# Patient Record
Sex: MALE | Race: WHITE | NOT HISPANIC OR LATINO
[De-identification: names, ages, dates, MRNs, and addresses within clinical notes are randomized per-mention and may not be internally consistent; named-entity substitution may affect disease eponyms.]

---

## 2023-06-20 PROBLEM — Z00.00 ENCOUNTER FOR PREVENTIVE HEALTH EXAMINATION: Status: ACTIVE | Noted: 2023-06-20

## 2023-06-21 ENCOUNTER — APPOINTMENT (OUTPATIENT)
Dept: ORTHOPEDIC SURGERY | Facility: CLINIC | Age: 48
End: 2023-06-21
Payer: COMMERCIAL

## 2023-06-21 VITALS — BODY MASS INDEX: 29.49 KG/M2 | WEIGHT: 206 LBS | HEIGHT: 70 IN

## 2023-06-21 DIAGNOSIS — Z86.39 PERSONAL HISTORY OF OTHER ENDOCRINE, NUTRITIONAL AND METABOLIC DISEASE: ICD-10-CM

## 2023-06-21 PROCEDURE — 73564 X-RAY EXAM KNEE 4 OR MORE: CPT | Mod: 50

## 2023-06-21 PROCEDURE — 20611 DRAIN/INJ JOINT/BURSA W/US: CPT | Mod: 50

## 2023-06-21 PROCEDURE — 99214 OFFICE O/P EST MOD 30 MIN: CPT | Mod: 25

## 2023-06-21 PROCEDURE — J3490M: CUSTOM

## 2023-06-21 RX ORDER — LEVOTHYROXINE SODIUM 137 UG/1
TABLET ORAL
Refills: 0 | Status: ACTIVE | COMMUNITY

## 2023-06-27 ENCOUNTER — APPOINTMENT (OUTPATIENT)
Dept: MRI IMAGING | Facility: CLINIC | Age: 48
End: 2023-06-27

## 2023-06-27 NOTE — DISCUSSION/SUMMARY
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 \par X-Ray left knee reveals evidence of mild degenerative change - maintained lateral joint line space \par \par Discussed treatment options in the form of injection therapy to aid in inflammation. Patient elected to receive BILATERAL KNEE 18/4 CSI under ultrasound guidance. Advised patient to rest and ice the area. \par \par In regard to RIGHT KNEE...\par \par Discussed risks of surgical treatment and nonsurgical treatment of arthritis, discussed risks of steroid injection plus or minus viscosupplementation, risks of zilretta and benefits, role of surgery and MRI, risks and role of NSAIDs and side effects, benefits of therapy. \par Discussed risks and benefits of total knee arthroplasty vs arthroscopic surgery however, risks outweigh benefits and we will try to avoid.\par The risks and benefits of surgery have been discussed. Risks include but are not limited to bleeding, infection, reaction to anesthesia, injury to blood vessels and nerves, malunion, nonunion, DVT, PE, necessity of repeat surgery, chronic pain, loss of limb and death. The patient understands the risks and has chosen to proceed with conservative care. All questions have been answered.\par \par Will put in auth for EUFLEXXA RIGHT KNEE. \par Prescribed patient Mobic, and discussed risks of side effects and timing and management of medication.  Side effects can include but are not limited to gi ulcers and irritation, as well as kidney failure and bleeding issues. Use as directed and take with food.\par \par In regard to LEFT KNEE...\par \par Due to worsening pain and instability with catching/locking mechanical symptoms, recommend the patient obtain MRI left knee to rule out possible lateral meniscus tear vs OA. Follow up after MRI to possibly rule out surgical pathology and discuss future treatment options. \par \par Follow up 2-3 weeks

## 2023-06-27 NOTE — PROCEDURE
[Large Joint Injection] : Large joint injection [Bilateral] : bilaterally of the [Knee] : knee [X-ray evidence of Osteoarthritis on this or prior visit] : x-ray evidence of Osteoarthritis on this or prior visit [Alcohol] : alcohol [Betadine] : betadine [Ethyl Chloride sprayed topically] : ethyl chloride sprayed topically [Sterile technique used] : sterile technique used [___ cc    3mg] :  Betamethasone (Celestone) ~Vcc of 3mg [___ cc    0.25%] : Bupivacaine (Marcaine) ~Vcc of 0.25%  [Call if redness, pain or fever occur] : call if redness, pain or fever occur [Apply ice for 15min out of every hour for the next 12-24 hours as tolerated] : apply ice for 15 minutes out of every hour for the next 12-24 hours as tolerated [Patient was advised to rest the joint(s) for ____ days] : patient was advised to rest the joint(s) for [unfilled] days [Previous OTC use and PT nontherapeutic] : patient has tried OTC's including aspirin, Ibuprofen, Aleve, etc or prescription NSAIDS, and/or exercises at home and/or physical therapy without satisfactory response [Risks, benefits, alternatives discussed / Verbal consent obtained] : the risks benefits, and alternatives have been discussed, and verbal consent was obtained [Prior failure or difficult injection] : prior failure or difficult injection [All ultrasound images have been permanently captured and stored accordingly in our picture archiving and communication system] : All ultrasound images have been permanently captured and stored accordingly in our picture archiving and communication system [Visualization of the needle and placement of injection was performed without complication] : visualization of the needle and placement of injection was performed without complication [Pain] : pain [Arthritis] : arthritis [FreeTextEntry1] : BILATERAL KNEE 18/4 [FreeTextEntry3] : Large joint injections were performed of bilateral knees. Injections of Bupivacaine (Marcaine) cc of 0.5% was used Betamethasone (Celestone) cc of 6mg. \par Patient was advised to call if redness, pain or fever occur and apply ice for 15 minutes out of every hour for the next 12-24 hours as tolerated. \par \par Patient has tried OTC's including aspirin, Ibuprofen, Aleve, etc or prescription NSAIDS, and/or exercises at home and/or physical therapy without satisfactory response and the risks benefits, and alternatives have been discussed, and verbal consent was obtained. \par \par Ultrasound guidance was indicated for this patient due to precise injection in area of tear. All ultrasound images have been permanently captured and stored accordingly in our picture archiving and communication system. Visualization of the needle and placement of injection was performed without complication. \par The findings showed no evidence of ligament, tendon or muscle tear and no effusion or other fluid collection.

## 2023-06-27 NOTE — HISTORY OF PRESENT ILLNESS
[de-identified] : New patient is here for bilateral knee pain. Pt has been seen for the right knee before, finished Euflexxa injections in the right knee on 2/1/22, began noticing pain about 4 months- no specific injury. Right knee ACL recon about 22 years. Left knee has never been looked at, pain started 1 month ago- no specific injury. No previous issues with the left knee, has never had injections in the left knee. Interested in injection for knees if applicable. Pain in bilateral knees does not radiate. Not going to PT, only takes Advil when needed.

## 2023-06-27 NOTE — PHYSICAL EXAM
[Left] : left knee [Right] : right knee [NL (140)] : flexion 140 degrees [NL (0)] : extension 0 degrees [5___] : hamstring 5[unfilled]/5 [Negative] : negative anterior draw [Bilateral] : knee bilaterally [All Views] : anteroposterior, lateral, skyline, and anteroposterior standing [Positive] : positive Maryellen [] : no pain with varus stress [FreeTextEntry9] : X-Ray right knee reveals evidence of previous ACl reconstruction with associated ACL deficient arthritis. \par X-Ray left knee reveals evidence of mild degenerative change - maintained lateral joint line space  [de-identified] : extension 3 degrees

## 2023-06-28 ENCOUNTER — APPOINTMENT (OUTPATIENT)
Dept: ORTHOPEDIC SURGERY | Facility: CLINIC | Age: 48
End: 2023-06-28
Payer: COMMERCIAL

## 2023-06-28 VITALS — HEIGHT: 70 IN | WEIGHT: 206 LBS | BODY MASS INDEX: 29.49 KG/M2

## 2023-06-28 PROCEDURE — 99213 OFFICE O/P EST LOW 20 MIN: CPT | Mod: 25

## 2023-06-28 PROCEDURE — 20611 DRAIN/INJ JOINT/BURSA W/US: CPT | Mod: 50

## 2023-07-06 NOTE — HISTORY OF PRESENT ILLNESS
[de-identified] : Patient is here for a follow up on bilateral knees. Patient notes Celestone injection did give some relief. Patient would like to continue with Euflexxa injections if applicable htough still in pain and unsurehe should proceed. Patient notes the left knee is better than the right. Patient did not get the MRI on the left knee yet.

## 2023-07-06 NOTE — PHYSICAL EXAM
[Left] : left knee [Positive] : positive Maryellen [Right] : right knee [NL (140)] : flexion 140 degrees [NL (0)] : extension 0 degrees [5___] : hamstring 5[unfilled]/5 [Negative] : negative anterior draw [] : no pain with varus stress [FreeTextEntry9] : With crepitus  [de-identified] : extension 3 degrees

## 2023-07-06 NOTE — DISCUSSION/SUMMARY
[Medication Risks Reviewed] : Medication risks reviewed [Surgical risks reviewed] : Surgical risks reviewed [de-identified] : \par \par The patient reports mild improvement from previous bilateral knee CSI on 06/21/23. \par \par Discussed risks of surgical treatment and nonsurgical treatment of arthritis, discussed risks of steroid injection plus or minus viscosupplementation, risks of zilretta and benefits, role of surgery and MRI, risks and role of NSAIDs and side effects, benefits of therapy. \par evaluated knee and decided to proceed with euflexxa injections, discussed future treatment and option, will proceed, discussed possible surgery vs alternatives in future\par The risks, benefits and contents of the injection have been discussed.  Risks include but are not limited to allergic reaction, flare reaction, permanent white skin discoloration at the injection site and infection.  The patient understands the risks and agrees to having the injection.  All questions have been answered.\par \par \par Discussed further treatment options in the form of injection therapy. Patient elected to receive BILATERAL KNEE EUFLEXXA #1 under ultrasound guidance. Advised patient to rest and ice the area. \par \par Discussed risks and benefits of total knee arthroplasty vs arthroscopic surgery however, risks outweigh benefits and we will try to avoid.\par \par Prescribed patient Mobic, and discussed risks of side effects and timing and management of medication.  Side effects can include but are not limited to gi ulcers and irritation, as well as kidney failure and bleeding issues. Use as directed and take with food.\par \par In regard to LEFT KNEE...\par Patient never obtain MRI left knee. \par Due to worsening pain and instability with catching/locking mechanical symptoms, recommend the patient obtain MRI left knee to rule out possible lateral meniscus tear vs OA. Follow up after MRI to possibly rule out surgical pathology and discuss future treatment options. \par \par Follow up 1 week

## 2023-07-06 NOTE — PROCEDURE
[Large Joint Injection] : Large joint injection [Bilateral] : bilaterally of the [Knee] : knee [X-ray evidence of Osteoarthritis on this or prior visit] : x-ray evidence of Osteoarthritis on this or prior visit [Alcohol] : alcohol [Betadine] : betadine [Ethyl Chloride sprayed topically] : ethyl chloride sprayed topically [Sterile technique used] : sterile technique used [Euflexxa(20mg)] : 20mg of Euflexxa [#1] : series #1 [Call if redness, pain or fever occur] : call if redness, pain or fever occur [Apply ice for 15min out of every hour for the next 12-24 hours as tolerated] : apply ice for 15 minutes out of every hour for the next 12-24 hours as tolerated [Patient was advised to rest the joint(s) for ____ days] : patient was advised to rest the joint(s) for [unfilled] days [Previous OTC use and PT nontherapeutic] : patient has tried OTC's including aspirin, Ibuprofen, Aleve, etc or prescription NSAIDS, and/or exercises at home and/or physical therapy without satisfactory response [Risks, benefits, alternatives discussed / Verbal consent obtained] : the risks benefits, and alternatives have been discussed, and verbal consent was obtained [Prior failure or difficult injection] : prior failure or difficult injection [All ultrasound images have been permanently captured and stored accordingly in our picture archiving and communication system] : All ultrasound images have been permanently captured and stored accordingly in our picture archiving and communication system [Visualization of the needle and placement of injection was performed without complication] : visualization of the needle and placement of injection was performed without complication [Pain] : pain [FreeTextEntry1] : BILATERAL KNEE EUFLEXXA #1

## 2023-07-10 ENCOUNTER — FORM ENCOUNTER (OUTPATIENT)
Age: 48
End: 2023-07-10

## 2023-07-11 ENCOUNTER — APPOINTMENT (OUTPATIENT)
Dept: MRI IMAGING | Facility: CLINIC | Age: 48
End: 2023-07-11
Payer: COMMERCIAL

## 2023-07-11 PROCEDURE — 73721 MRI JNT OF LWR EXTRE W/O DYE: CPT | Mod: LT

## 2023-07-12 ENCOUNTER — APPOINTMENT (OUTPATIENT)
Dept: ORTHOPEDIC SURGERY | Facility: CLINIC | Age: 48
End: 2023-07-12
Payer: COMMERCIAL

## 2023-07-12 VITALS — BODY MASS INDEX: 29.49 KG/M2 | HEIGHT: 70 IN | WEIGHT: 206 LBS

## 2023-07-12 DIAGNOSIS — M23.91 UNSPECIFIED INTERNAL DERANGEMENT OF RIGHT KNEE: ICD-10-CM

## 2023-07-12 PROCEDURE — 99214 OFFICE O/P EST MOD 30 MIN: CPT | Mod: 25

## 2023-07-12 PROCEDURE — 20611 DRAIN/INJ JOINT/BURSA W/US: CPT | Mod: 50

## 2023-07-12 NOTE — PROCEDURE
[Large Joint Injection] : Large joint injection [Bilateral] : bilaterally of the [Knee] : knee [X-ray evidence of Osteoarthritis on this or prior visit] : x-ray evidence of Osteoarthritis on this or prior visit [Alcohol] : alcohol [Betadine] : betadine [Ethyl Chloride sprayed topically] : ethyl chloride sprayed topically [Sterile technique used] : sterile technique used [Euflexxa(20mg)] : 20mg of Euflexxa [#2] : series #2 [Call if redness, pain or fever occur] : call if redness, pain or fever occur [Apply ice for 15min out of every hour for the next 12-24 hours as tolerated] : apply ice for 15 minutes out of every hour for the next 12-24 hours as tolerated [Patient was advised to rest the joint(s) for ____ days] : patient was advised to rest the joint(s) for [unfilled] days [Previous OTC use and PT nontherapeutic] : patient has tried OTC's including aspirin, Ibuprofen, Aleve, etc or prescription NSAIDS, and/or exercises at home and/or physical therapy without satisfactory response [Risks, benefits, alternatives discussed / Verbal consent obtained] : the risks benefits, and alternatives have been discussed, and verbal consent was obtained [Prior failure or difficult injection] : prior failure or difficult injection [All ultrasound images have been permanently captured and stored accordingly in our picture archiving and communication system] : All ultrasound images have been permanently captured and stored accordingly in our picture archiving and communication system [Visualization of the needle and placement of injection was performed without complication] : visualization of the needle and placement of injection was performed without complication [Pain] : pain

## 2023-07-16 PROBLEM — M23.91 INTERNAL DERANGEMENT OF RIGHT KNEE: Status: ACTIVE | Noted: 2023-06-26

## 2023-07-16 NOTE — DISCUSSION/SUMMARY
[Medication Risks Reviewed] : Medication risks reviewed [Surgical risks reviewed] : Surgical risks reviewed [de-identified] : no complications observed from first injection\par \par Discussed risks of surgical treatment and nonsurgical treatment of arthritis, discussed risks of steroid injection plus or minus viscosupplementation, risks of zilretta and benefits, role of surgery and MRI, risks and role of NSAIDs and side effects, benefits of therapy. \par \par Discussed risks and benefits of total knee arthroplasty vs arthroscopic surgery however, risks outweigh benefits and we will try to avoid.\par \par evaluated knee and decided to proceed with euflexxa injections, discussed future treatment and option, will proceed, discussed possible surgery vs alternatives in future\par The risks, benefits and contents of the injection have been discussed.  Risks include but are not limited to allergic reaction, flare reaction, permanent white skin discoloration at the injection site and infection.  The patient understands the risks and agrees to having the injection.  All questions have been answered.\par \par Patient elected to receive BILATERAL KNEE EUFLEXXA #2 under ultrasound guidance. Advised patient to rest and ice the area. \par \par Prescribed patient Mobic, and discussed risks of side effects and timing and management of medication.  Side effects can include but are not limited to gi ulcers and irritation, as well as kidney failure and bleeding issues. Use as directed and take with food.\par (an e/m was performed and billed with this injection because the patients treatment plan is still evolving and in a state of flux, unclear if injections are helping or worth continuing and at this and each of the visits we are re evaluating the patients exam , complaints , response to treatment and discussing risks of total knee replacement which may be inevitable. We have also once again reviewed alternative provided we don’t want to proceed with the injection even though we ultimately decided to proceed)\par \par In regard to LEFT KNEE...\par Patient never obtain MRI left knee.  having worsening symptoms of left knee\par Due to worsening pain and instability with catching/locking mechanical symptoms, recommend the patient obtain MRI left knee to rule out possible lateral meniscus tear vs OA. Follow up after MRI to possibly rule out surgical pathology and discuss future treatment options. \par \par Follow up 1 week\par 30 mins face to face

## 2023-07-16 NOTE — HISTORY OF PRESENT ILLNESS
[de-identified] : Patient is here for a follow up on bilateral knees. Patient notes little to no changes since the last visit and notes the right knee is worse. needs more relief

## 2023-07-16 NOTE — PHYSICAL EXAM
[Left] : left knee [Positive] : positive Maryellen [Right] : right knee [NL (140)] : flexion 140 degrees [NL (0)] : extension 0 degrees [5___] : hamstring 5[unfilled]/5 [Negative] : negative anterior draw [] : no pain with varus stress [FreeTextEntry9] : With crepitus  [de-identified] : extension 3 degrees

## 2023-07-19 ENCOUNTER — APPOINTMENT (OUTPATIENT)
Dept: ORTHOPEDIC SURGERY | Facility: CLINIC | Age: 48
End: 2023-07-19
Payer: COMMERCIAL

## 2023-07-19 VITALS — WEIGHT: 206 LBS | HEIGHT: 70 IN | BODY MASS INDEX: 29.49 KG/M2

## 2023-07-19 DIAGNOSIS — M17.11 UNILATERAL PRIMARY OSTEOARTHRITIS, RIGHT KNEE: ICD-10-CM

## 2023-07-19 DIAGNOSIS — M23.92 UNSPECIFIED INTERNAL DERANGEMENT OF LEFT KNEE: ICD-10-CM

## 2023-07-19 DIAGNOSIS — M17.12 UNILATERAL PRIMARY OSTEOARTHRITIS, LEFT KNEE: ICD-10-CM

## 2023-07-19 PROCEDURE — 20611 DRAIN/INJ JOINT/BURSA W/US: CPT | Mod: 50

## 2023-07-19 PROCEDURE — 99213 OFFICE O/P EST LOW 20 MIN: CPT | Mod: 25

## 2023-07-19 NOTE — PROCEDURE
[Large Joint Injection] : Large joint injection [Bilateral] : bilaterally of the [Knee] : knee [X-ray evidence of Osteoarthritis on this or prior visit] : x-ray evidence of Osteoarthritis on this or prior visit [Alcohol] : alcohol [Betadine] : betadine [Ethyl Chloride sprayed topically] : ethyl chloride sprayed topically [Sterile technique used] : sterile technique used [Euflexxa(20mg)] : 20mg of Euflexxa [#3] : series #3 [Call if redness, pain or fever occur] : call if redness, pain or fever occur [Apply ice for 15min out of every hour for the next 12-24 hours as tolerated] : apply ice for 15 minutes out of every hour for the next 12-24 hours as tolerated [Patient was advised to rest the joint(s) for ____ days] : patient was advised to rest the joint(s) for [unfilled] days [Previous OTC use and PT nontherapeutic] : patient has tried OTC's including aspirin, Ibuprofen, Aleve, etc or prescription NSAIDS, and/or exercises at home and/or physical therapy without satisfactory response [Risks, benefits, alternatives discussed / Verbal consent obtained] : the risks benefits, and alternatives have been discussed, and verbal consent was obtained [Prior failure or difficult injection] : prior failure or difficult injection [All ultrasound images have been permanently captured and stored accordingly in our picture archiving and communication system] : All ultrasound images have been permanently captured and stored accordingly in our picture archiving and communication system [Visualization of the needle and placement of injection was performed without complication] : visualization of the needle and placement of injection was performed without complication [Pain] : pain

## 2023-07-23 PROBLEM — M23.92 ACUTE INTERNAL DERANGEMENT OF LEFT KNEE: Status: ACTIVE | Noted: 2023-06-26

## 2023-07-23 NOTE — PHYSICAL EXAM
[Left] : left knee [Positive] : positive Maryellen [Right] : right knee [NL (140)] : flexion 140 degrees [NL (0)] : extension 0 degrees [5___] : hamstring 5[unfilled]/5 [Negative] : negative anterior draw [] : no pain with varus stress [FreeTextEntry9] : With crepitus  [de-identified] : extension 3 degrees

## 2023-07-23 NOTE — DATA REVIEWED
[MRI] : MRI [Left] : left [Knee] : knee [Report was reviewed and noted in the chart] : The report was reviewed and noted in the chart [I independently reviewed and interpreted images and report] : I independently reviewed and interpreted images and report [I reviewed the films/CD] : I reviewed the films/CD [FreeTextEntry1] : MRI left knee reveals evidence of lateral meniscal tearing and tricompartmental arthrosis most prominent laterally

## 2023-07-23 NOTE — DISCUSSION/SUMMARY
[Medication Risks Reviewed] : Medication risks reviewed [Surgical risks reviewed] : Surgical risks reviewed [de-identified] : \par \par Discussed risks of surgical treatment and nonsurgical treatment of arthritis, discussed risks of steroid injection plus or minus viscosupplementation, risks of zilretta and benefits, role of surgery and MRI, risks and role of NSAIDs and side effects, benefits of therapy. \par \par Discussed risks and benefits of total knee arthroplasty vs arthroscopic surgery however, risks outweigh benefits and we will try to avoid.\par evaluated knee and decided to proceed with euflexxa injections, discussed future treatment and option, will proceed, discussed possible surgery vs alternatives in future\par The risks, benefits and contents of the injection have been discussed.  Risks include but are not limited to allergic reaction, flare reaction, permanent white skin discoloration at the injection site and infection.  The patient understands the risks and agrees to having the injection.  All questions have been answered.\par \par discussed future options given last injection , surgical and nonsurgical\par BILATERAL KNEE EUFLEXXA #3 under ultrasound guidance. Advised patient to rest and ice the area. \par \par (an e/m was performed and billed with this injection because the patients treatment plan is still evolving and in a state of flux, unclear if injections are helping or worth continuing and at this and each of the visits we are re evaluating the patients exam , complaints , response to treatment and discussing risks of total knee replacement which may be inevitable. We have also once again reviewed alternative provided we don’t want to proceed with the injection even though we ultimately decided to proceed)\par \par In regard to LEFT KNEE...\par MRI left knee reveals evidence of lateral meniscal tearing and tricompartmental arthrosis most prominent laterally\par \par We reviewed the mri findings and discussed treatment options, both operative and non operative for patients meniscal tearing considering OA. Discussed risks of potential surgery. However, due to the risks of the surgery, we will try NSAIDs and therapy. Discussed management of medication. \par \par Higher failure rate of arthroscopic Sx considering degenerative changes. Would hold off\par  Follow up on a PRN basis unless new symptoms arise. \par \par 30 mins face to face

## 2023-07-23 NOTE — HISTORY OF PRESENT ILLNESS
[de-identified] : Patient is here for a test follow up on the left knee. Patient notes left knee is feeling better since last visit. MRI 7/11/23 of left knee found lateral meniscal tearing with OA. Patient would like to continue with Euflexxa injections if applicable.

## 2024-08-05 ENCOUNTER — APPOINTMENT (OUTPATIENT)
Dept: ORTHOPEDIC SURGERY | Facility: CLINIC | Age: 49
End: 2024-08-05

## 2024-08-05 PROCEDURE — 73564 X-RAY EXAM KNEE 4 OR MORE: CPT | Mod: 50

## 2024-08-05 PROCEDURE — 99214 OFFICE O/P EST MOD 30 MIN: CPT | Mod: 25

## 2024-08-05 PROCEDURE — J3490M: CUSTOM

## 2024-08-05 PROCEDURE — 20611 DRAIN/INJ JOINT/BURSA W/US: CPT | Mod: RT

## 2024-08-08 NOTE — PROCEDURE
[FreeTextEntry3] : Procedure Note: Musculoskeletal Injection   Procedure: right  knee  Zilretta injection  Indication:  The patient has had persistent pain despite conservative treatment.  Risks, benefits and alternatives to procedure were discussed; all questions were answered to the patient's apparent satisfaction and informed consent obtained.  The patient denied prior problems with local anesthetics, injectable cortisones, chicken allergy, coagulopathy and no relevant drug or preservative allergies or sensitivities.  The area of injection was prepared in a sterile fashion.  Prior to injection a 'Time Out' was conducted in accordance with [facility] [place] policy and the site and nature of procedure verified with the patient.    Procedure: The procedure was carried out utilizing sterile technique from a superolateral arthroscopic portal position. [The needle was placed under ultrasound guidance to improve accuracy and minimize risk to the patient and diagnostic ultrasound in the long and short axis revealed evidence of OA   Ultrasound Indication: prior failure/ difficult to inj   0cc of clear synovial fluid was aspirated.   The specimen:   (X) appeared benign and was discarded   ( ) was sent for Culture / Cell Count / Crystal analysis / [_].]     Injection into the target area with care taken to aspirate frequently to minimize the risk of intravascular injection was performed with:   ( ) 5cc of 32mg Zilretta, prepared and diluted per  instructions    Patient tolerated the procedure well and direct pressure was applied for hemostasis. The patient was reminded of potential post-injection risks including, but not limited to, delayed hypersensitivity reactions and/or infection.  The patient verified that they had the office and the Emergency Room's contact information if any problems should arise.  After several minutes, the patient informed me that they felt fine and was released from the office.

## 2024-08-08 NOTE — DISCUSSION/SUMMARY
[Medication Risks Reviewed] : Medication risks reviewed [Surgical risks reviewed] : Surgical risks reviewed [de-identified] : The patient's condition is acute on chronic  Confounding medical conditions/concerns: hx of thyroid disorder, hx of knee surgery, Tests/Studies Independently Interpreted Today: xray of the B/L knee revealed evidence of moderate OA R>L ------------------------------------------------------------------------------------------------------------------  Pt notes his L knee is doing well after his B/l knee Euflexxa series on 7/19/23but still has persistent pain in the R knee  Discussed risks of surgical treatment and nonsurgical treatment of arthritis, discussed risks of steroid injection plus or minus Visco supplementation, risks of Zilretta and benefits, role of surgery and MRI, risks and role of NSAIDs and side effects, benefits of therapy. The patient is aware and understands that if he fails all conservative treatment modalities, he should consider a total knee arthroplasty. In the interim, we will focus on conservative management of arthritic related pain.    Discussed risks and benefits of total knee arthroplasty vs arthroscopic surgery however, risks outweigh benefits, and we will try to avoid. Discussed further conservative treatment options  for the R knee including R Knee Zilretta. Pt would like to proceed with R knee Zilretta today   Discussed treatment options in the form of injection therapy. Patient elected to receive R knee Zilretta under ultrasound guidance. Advised patient to rest and ice the area.   The risks, benefits and contents of the injection have been discussed. Risks include but are not limited to allergic reaction, flare reaction, permanent white skin discoloration at the injection site and infection.  The patient understands the risks and agrees to having the injection.  All questions have been answered.   Discussed the timing of the injections and the follow up that is needed. Advised the patient to ice the area that was injected and that it may take a few days before experiencing relief. Prescribed patient Meloxicam 15mg daily and discussed risks of side effects, as well as timing/management of medication.  Side effects can include but are not limited to gastrointestinal ulcers and irritation, kidney failure, and bleeding issues. Use as directed and take with food to manage pain, inflammation, and discomfort. Will submit for visco inj  f/u in 4 weeks if pain persist     I, Shirin Baptiste, attest that this documentation has been prepared under the direction and in the presence of Provider DANIEL Angeles

## 2024-08-08 NOTE — HISTORY OF PRESENT ILLNESS
[de-identified] : Patient is here to follow up on bilateral knees. Completed Euflexxa series on 7/19/23 with 6 month relief for right knee. Note no pain with left knee, doing well. Never tried Zilretta, only Celestone prior to visco. Increased pain with walking/bending for right.

## 2024-08-08 NOTE — HISTORY OF PRESENT ILLNESS
[de-identified] : Patient is here to follow up on bilateral knees. Completed Euflexxa series on 7/19/23 with 6 month relief for right knee. Note no pain with left knee, doing well. Never tried Zilretta, only Celestone prior to visco. Increased pain with walking/bending for right.

## 2024-08-08 NOTE — PHYSICAL EXAM
[Left] : left knee [NL (140)] : flexion 140 degrees [Positive] : positive Maryellen [Right] : right knee [5___] : hamstring 5[unfilled]/5 [] : no atrophy [FreeTextEntry9] :  xray of the B/L knee revealed evidence of moderate OA R>L [TWNoteComboBox7] : flexion 130 degrees

## 2024-08-08 NOTE — DISCUSSION/SUMMARY
[Medication Risks Reviewed] : Medication risks reviewed [Surgical risks reviewed] : Surgical risks reviewed [de-identified] : The patient's condition is acute on chronic  Confounding medical conditions/concerns: hx of thyroid disorder, hx of knee surgery, Tests/Studies Independently Interpreted Today: xray of the B/L knee revealed evidence of moderate OA R>L ------------------------------------------------------------------------------------------------------------------  Pt notes his L knee is doing well after his B/l knee Euflexxa series on 7/19/23but still has persistent pain in the R knee  Discussed risks of surgical treatment and nonsurgical treatment of arthritis, discussed risks of steroid injection plus or minus Visco supplementation, risks of Zilretta and benefits, role of surgery and MRI, risks and role of NSAIDs and side effects, benefits of therapy. The patient is aware and understands that if he fails all conservative treatment modalities, he should consider a total knee arthroplasty. In the interim, we will focus on conservative management of arthritic related pain.    Discussed risks and benefits of total knee arthroplasty vs arthroscopic surgery however, risks outweigh benefits, and we will try to avoid. Discussed further conservative treatment options  for the R knee including R Knee Zilretta. Pt would like to proceed with R knee Zilretta today   Discussed treatment options in the form of injection therapy. Patient elected to receive R knee Zilretta under ultrasound guidance. Advised patient to rest and ice the area.   The risks, benefits and contents of the injection have been discussed. Risks include but are not limited to allergic reaction, flare reaction, permanent white skin discoloration at the injection site and infection.  The patient understands the risks and agrees to having the injection.  All questions have been answered.   Discussed the timing of the injections and the follow up that is needed. Advised the patient to ice the area that was injected and that it may take a few days before experiencing relief. Prescribed patient Meloxicam 15mg daily and discussed risks of side effects, as well as timing/management of medication.  Side effects can include but are not limited to gastrointestinal ulcers and irritation, kidney failure, and bleeding issues. Use as directed and take with food to manage pain, inflammation, and discomfort. Will submit for visco inj  f/u in 4 weeks if pain persist     I, Shirin Baptiste, attest that this documentation has been prepared under the direction and in the presence of Provider DANIEL Angeles

## 2024-08-08 NOTE — HISTORY OF PRESENT ILLNESS
[de-identified] : Patient is here to follow up on bilateral knees. Completed Euflexxa series on 7/19/23 with 6 month relief for right knee. Note no pain with left knee, doing well. Never tried Zilretta, only Celestone prior to visco. Increased pain with walking/bending for right.

## 2024-08-08 NOTE — PROCEDURE
[FreeTextEntry3] : Procedure Note: Musculoskeletal Injection   Procedure: right  knee  Zilretta injection  Indication:  The patient has had persistent pain despite conservative treatment.  Risks, benefits and alternatives to procedure were discussed; all questions were answered to the patient's apparent satisfaction and informed consent obtained.  The patient denied prior problems with local anesthetics, injectable cortisones, chicken allergy, coagulopathy and no relevant drug or preservative allergies or sensitivities.  The area of injection was prepared in a sterile fashion.  Prior to injection a 'Time Out' was conducted in accordance with [facility] policy and the site and nature of procedure verified with the patient.    Procedure: The procedure was carried out utilizing sterile technique from a superolateral arthroscopic portal position. [The needle was placed under ultrasound guidance to improve accuracy and minimize risk to the patient and diagnostic ultrasound in the long and short axis revealed evidence of OA   Ultrasound Indication: prior failure/ difficult to inj   0cc of clear synovial fluid was aspirated.   The specimen:   (X) appeared benign and was discarded   ( ) was sent for Culture / Cell Count / Crystal analysis / [_].]     Injection into the target area with care taken to aspirate frequently to minimize the risk of intravascular injection was performed with:   ( ) 5cc of 32mg Zilretta, prepared and diluted per  instructions    Patient tolerated the procedure well and direct pressure was applied for hemostasis. The patient was reminded of potential post-injection risks including, but not limited to, delayed hypersensitivity reactions and/or infection.  The patient verified that they had the office and the Emergency Room's contact information if any problems should arise.  After several minutes, the patient informed me that they felt fine and was released from the office.

## 2024-08-08 NOTE — PROCEDURE
[FreeTextEntry3] : Procedure Note: Musculoskeletal Injection   Procedure: right  knee  Zilretta injection  Indication:  The patient has had persistent pain despite conservative treatment.  Risks, benefits and alternatives to procedure were discussed; all questions were answered to the patient's apparent satisfaction and informed consent obtained.  The patient denied prior problems with local anesthetics, injectable cortisones, chicken allergy, coagulopathy and no relevant drug or preservative allergies or sensitivities.  The area of injection was prepared in a sterile fashion.  Prior to injection a 'Time Out' was conducted in accordance with [facility] policy and the site and nature of procedure verified with the patient.    Procedure: The procedure was carried out utilizing sterile technique from a superolateral arthroscopic portal position. [The needle was placed under ultrasound guidance to improve accuracy and minimize risk to the patient and diagnostic ultrasound in the long and short axis revealed evidence of OA   Ultrasound Indication: prior failure/ difficult to inj   0cc of clear synovial fluid was aspirated.   The specimen:   (X) appeared benign and was discarded   ( ) was sent for Culture / Cell Count / Crystal analysis / [_].]     Injection into the target area with care taken to aspirate frequently to minimize the risk of intravascular injection was performed with:   ( ) 5cc of 32mg Zilretta, prepared and diluted per  instructions    Patient tolerated the procedure well and direct pressure was applied for hemostasis. The patient was reminded of potential post-injection risks including, but not limited to, delayed hypersensitivity reactions and/or infection.  The patient verified that they had the office and the Emergency Room's contact information if any problems should arise.  After several minutes, the patient informed me that they felt fine and was released from the office.

## 2024-08-08 NOTE — DISCUSSION/SUMMARY
[Medication Risks Reviewed] : Medication risks reviewed [Surgical risks reviewed] : Surgical risks reviewed [de-identified] : The patient's condition is acute on chronic  Confounding medical conditions/concerns: hx of thyroid disorder, hx of knee surgery, Tests/Studies Independently Interpreted Today: xray of the B/L knee revealed evidence of moderate OA R>L ------------------------------------------------------------------------------------------------------------------  Pt notes his L knee is doing well after his B/l knee Euflexxa series on 7/19/23but still has persistent pain in the R knee  Discussed risks of surgical treatment and nonsurgical treatment of arthritis, discussed risks of steroid injection plus or minus Visco supplementation, risks of Zilretta and benefits, role of surgery and MRI, risks and role of NSAIDs and side effects, benefits of therapy. The patient is aware and understands that if he fails all conservative treatment modalities, he should consider a total knee arthroplasty. In the interim, we will focus on conservative management of arthritic related pain.    Discussed risks and benefits of total knee arthroplasty vs arthroscopic surgery however, risks outweigh benefits, and we will try to avoid. Discussed further conservative treatment options  for the R knee including R Knee Zilretta. Pt would like to proceed with R knee Zilretta today   Discussed treatment options in the form of injection therapy. Patient elected to receive R knee Zilretta under ultrasound guidance. Advised patient to rest and ice the area.   The risks, benefits and contents of the injection have been discussed. Risks include but are not limited to allergic reaction, flare reaction, permanent white skin discoloration at the injection site and infection.  The patient understands the risks and agrees to having the injection.  All questions have been answered.   Discussed the timing of the injections and the follow up that is needed. Advised the patient to ice the area that was injected and that it may take a few days before experiencing relief. Prescribed patient Meloxicam 15mg daily and discussed risks of side effects, as well as timing/management of medication.  Side effects can include but are not limited to gastrointestinal ulcers and irritation, kidney failure, and bleeding issues. Use as directed and take with food to manage pain, inflammation, and discomfort. Will submit for visco inj  f/u in 4 weeks if pain persist     I, Shirin Baptiste, attest that this documentation has been prepared under the direction and in the presence of Provider DANIEL Angeles

## 2025-01-14 ENCOUNTER — APPOINTMENT (OUTPATIENT)
Dept: ORTHOPEDIC SURGERY | Facility: CLINIC | Age: 50
End: 2025-01-14
Payer: COMMERCIAL

## 2025-01-14 VITALS — WEIGHT: 209 LBS | BODY MASS INDEX: 29.92 KG/M2 | HEIGHT: 70 IN

## 2025-01-14 DIAGNOSIS — M23.91 UNSPECIFIED INTERNAL DERANGEMENT OF RIGHT KNEE: ICD-10-CM

## 2025-01-14 DIAGNOSIS — M17.12 UNILATERAL PRIMARY OSTEOARTHRITIS, LEFT KNEE: ICD-10-CM

## 2025-01-14 DIAGNOSIS — M23.92 UNSPECIFIED INTERNAL DERANGEMENT OF LEFT KNEE: ICD-10-CM

## 2025-01-14 DIAGNOSIS — M17.11 UNILATERAL PRIMARY OSTEOARTHRITIS, RIGHT KNEE: ICD-10-CM

## 2025-01-14 PROCEDURE — 73564 X-RAY EXAM KNEE 4 OR MORE: CPT | Mod: 50

## 2025-01-14 PROCEDURE — J3490M: CUSTOM

## 2025-01-14 PROCEDURE — 20611 DRAIN/INJ JOINT/BURSA W/US: CPT | Mod: RT

## 2025-01-14 PROCEDURE — 99214 OFFICE O/P EST MOD 30 MIN: CPT | Mod: 25

## 2025-02-04 ENCOUNTER — APPOINTMENT (OUTPATIENT)
Dept: ORTHOPEDIC SURGERY | Facility: CLINIC | Age: 50
End: 2025-02-04
Payer: COMMERCIAL

## 2025-02-04 VITALS — WEIGHT: 209 LBS | BODY MASS INDEX: 29.92 KG/M2 | HEIGHT: 70 IN

## 2025-02-04 DIAGNOSIS — M17.11 UNILATERAL PRIMARY OSTEOARTHRITIS, RIGHT KNEE: ICD-10-CM

## 2025-02-04 DIAGNOSIS — M23.92 UNSPECIFIED INTERNAL DERANGEMENT OF LEFT KNEE: ICD-10-CM

## 2025-02-04 PROCEDURE — 20611 DRAIN/INJ JOINT/BURSA W/US: CPT | Mod: RT

## 2025-02-04 PROCEDURE — 99214 OFFICE O/P EST MOD 30 MIN: CPT | Mod: 25

## 2025-02-11 ENCOUNTER — APPOINTMENT (OUTPATIENT)
Dept: ORTHOPEDIC SURGERY | Facility: CLINIC | Age: 50
End: 2025-02-11
Payer: COMMERCIAL

## 2025-02-11 VITALS — HEIGHT: 70 IN | WEIGHT: 209 LBS | BODY MASS INDEX: 29.92 KG/M2

## 2025-02-11 DIAGNOSIS — M23.91 UNSPECIFIED INTERNAL DERANGEMENT OF RIGHT KNEE: ICD-10-CM

## 2025-02-11 PROCEDURE — 99213 OFFICE O/P EST LOW 20 MIN: CPT | Mod: 25

## 2025-02-11 PROCEDURE — 20611 DRAIN/INJ JOINT/BURSA W/US: CPT | Mod: RT

## 2025-02-14 PROBLEM — M23.91 INTERNAL DERANGEMENT OF RIGHT KNEE: Status: ACTIVE | Noted: 2025-02-14

## 2025-02-18 ENCOUNTER — APPOINTMENT (OUTPATIENT)
Dept: ORTHOPEDIC SURGERY | Facility: CLINIC | Age: 50
End: 2025-02-18
Payer: COMMERCIAL

## 2025-02-18 VITALS — BODY MASS INDEX: 29.92 KG/M2 | HEIGHT: 70 IN | WEIGHT: 209 LBS

## 2025-02-18 DIAGNOSIS — M17.11 UNILATERAL PRIMARY OSTEOARTHRITIS, RIGHT KNEE: ICD-10-CM

## 2025-02-18 DIAGNOSIS — M17.12 UNILATERAL PRIMARY OSTEOARTHRITIS, LEFT KNEE: ICD-10-CM

## 2025-02-18 DIAGNOSIS — M23.91 UNSPECIFIED INTERNAL DERANGEMENT OF RIGHT KNEE: ICD-10-CM

## 2025-02-18 PROCEDURE — 20611 DRAIN/INJ JOINT/BURSA W/US: CPT | Mod: RT

## 2025-02-18 PROCEDURE — 99213 OFFICE O/P EST LOW 20 MIN: CPT | Mod: 25
